# Patient Record
Sex: FEMALE | Race: WHITE | NOT HISPANIC OR LATINO | Employment: OTHER | URBAN - METROPOLITAN AREA
[De-identification: names, ages, dates, MRNs, and addresses within clinical notes are randomized per-mention and may not be internally consistent; named-entity substitution may affect disease eponyms.]

---

## 2023-04-17 DIAGNOSIS — E11.9 TYPE 2 DIABETES MELLITUS WITHOUT COMPLICATION, WITHOUT LONG-TERM CURRENT USE OF INSULIN (MULTI): ICD-10-CM

## 2023-04-17 RX ORDER — METFORMIN HYDROCHLORIDE 500 MG/1
1000 TABLET, EXTENDED RELEASE ORAL 2 TIMES DAILY
COMMUNITY
End: 2023-04-17 | Stop reason: SDUPTHER

## 2023-04-17 RX ORDER — METFORMIN HYDROCHLORIDE 500 MG/1
1000 TABLET, EXTENDED RELEASE ORAL 2 TIMES DAILY
Qty: 120 TABLET | Refills: 0 | Status: SHIPPED | OUTPATIENT
Start: 2023-04-17 | End: 2023-04-25 | Stop reason: SDUPTHER

## 2023-04-25 ENCOUNTER — LAB (OUTPATIENT)
Dept: LAB | Facility: LAB | Age: 38
End: 2023-04-25

## 2023-04-25 ENCOUNTER — OFFICE VISIT (OUTPATIENT)
Dept: PRIMARY CARE | Facility: CLINIC | Age: 38
End: 2023-04-25

## 2023-04-25 VITALS
SYSTOLIC BLOOD PRESSURE: 112 MMHG | HEIGHT: 63 IN | BODY MASS INDEX: 36.36 KG/M2 | WEIGHT: 205.2 LBS | DIASTOLIC BLOOD PRESSURE: 70 MMHG | HEART RATE: 95 BPM

## 2023-04-25 DIAGNOSIS — E78.2 MIXED HYPERLIPIDEMIA: ICD-10-CM

## 2023-04-25 DIAGNOSIS — E11.9 TYPE 2 DIABETES MELLITUS WITHOUT COMPLICATION, WITHOUT LONG-TERM CURRENT USE OF INSULIN (MULTI): ICD-10-CM

## 2023-04-25 DIAGNOSIS — E11.9 TYPE 2 DIABETES MELLITUS WITHOUT COMPLICATION, WITHOUT LONG-TERM CURRENT USE OF INSULIN (MULTI): Primary | ICD-10-CM

## 2023-04-25 DIAGNOSIS — E03.9 HYPOTHYROIDISM, UNSPECIFIED TYPE: ICD-10-CM

## 2023-04-25 PROBLEM — R53.83 FATIGUE: Status: ACTIVE | Noted: 2023-04-25

## 2023-04-25 PROBLEM — E78.5 HLD (HYPERLIPIDEMIA): Status: ACTIVE | Noted: 2023-04-25

## 2023-04-25 PROBLEM — R21 RASH: Status: RESOLVED | Noted: 2023-04-25 | Resolved: 2023-04-25

## 2023-04-25 PROBLEM — R73.9 HYPERGLYCEMIA: Status: RESOLVED | Noted: 2023-04-25 | Resolved: 2023-04-25

## 2023-04-25 LAB
ALANINE AMINOTRANSFERASE (SGPT) (U/L) IN SER/PLAS: 39 U/L (ref 7–45)
ALBUMIN (G/DL) IN SER/PLAS: 4.5 G/DL (ref 3.4–5)
ALKALINE PHOSPHATASE (U/L) IN SER/PLAS: 59 U/L (ref 33–110)
ANION GAP IN SER/PLAS: 11 MMOL/L (ref 10–20)
ASPARTATE AMINOTRANSFERASE (SGOT) (U/L) IN SER/PLAS: 21 U/L (ref 9–39)
BILIRUBIN TOTAL (MG/DL) IN SER/PLAS: 0.5 MG/DL (ref 0–1.2)
CALCIUM (MG/DL) IN SER/PLAS: 9.7 MG/DL (ref 8.6–10.3)
CARBON DIOXIDE, TOTAL (MMOL/L) IN SER/PLAS: 28 MMOL/L (ref 21–32)
CHLORIDE (MMOL/L) IN SER/PLAS: 102 MMOL/L (ref 98–107)
CHOLESTEROL (MG/DL) IN SER/PLAS: 258 MG/DL (ref 0–199)
CHOLESTEROL IN HDL (MG/DL) IN SER/PLAS: 72 MG/DL
CHOLESTEROL/HDL RATIO: 3.6
CREATININE (MG/DL) IN SER/PLAS: 0.58 MG/DL (ref 0.5–1.05)
ESTIMATED AVERAGE GLUCOSE FOR HBA1C: 143 MG/DL
GFR FEMALE: >90 ML/MIN/1.73M2
GLUCOSE (MG/DL) IN SER/PLAS: 142 MG/DL (ref 74–99)
HEMOGLOBIN A1C/HEMOGLOBIN TOTAL IN BLOOD: 6.6 %
LDL: 143 MG/DL (ref 0–99)
NON HDL CHOLESTEROL: 186 MG/DL
POTASSIUM (MMOL/L) IN SER/PLAS: 4 MMOL/L (ref 3.5–5.3)
PROTEIN TOTAL: 7.1 G/DL (ref 6.4–8.2)
SODIUM (MMOL/L) IN SER/PLAS: 137 MMOL/L (ref 136–145)
THYROTROPIN (MIU/L) IN SER/PLAS BY DETECTION LIMIT <= 0.05 MIU/L: 21.04 MIU/L (ref 0.44–3.98)
TRIGLYCERIDE (MG/DL) IN SER/PLAS: 213 MG/DL (ref 0–149)
UREA NITROGEN (MG/DL) IN SER/PLAS: 12 MG/DL (ref 6–23)
VLDL: 43 MG/DL (ref 0–40)

## 2023-04-25 PROCEDURE — 1036F TOBACCO NON-USER: CPT | Performed by: STUDENT IN AN ORGANIZED HEALTH CARE EDUCATION/TRAINING PROGRAM

## 2023-04-25 PROCEDURE — 3074F SYST BP LT 130 MM HG: CPT | Performed by: STUDENT IN AN ORGANIZED HEALTH CARE EDUCATION/TRAINING PROGRAM

## 2023-04-25 PROCEDURE — 3078F DIAST BP <80 MM HG: CPT | Performed by: STUDENT IN AN ORGANIZED HEALTH CARE EDUCATION/TRAINING PROGRAM

## 2023-04-25 PROCEDURE — 84443 ASSAY THYROID STIM HORMONE: CPT

## 2023-04-25 PROCEDURE — 99214 OFFICE O/P EST MOD 30 MIN: CPT | Performed by: STUDENT IN AN ORGANIZED HEALTH CARE EDUCATION/TRAINING PROGRAM

## 2023-04-25 PROCEDURE — 36415 COLL VENOUS BLD VENIPUNCTURE: CPT

## 2023-04-25 PROCEDURE — 83036 HEMOGLOBIN GLYCOSYLATED A1C: CPT

## 2023-04-25 PROCEDURE — 80053 COMPREHEN METABOLIC PANEL: CPT

## 2023-04-25 PROCEDURE — 80061 LIPID PANEL: CPT

## 2023-04-25 RX ORDER — LEVOTHYROXINE SODIUM 125 UG/1
TABLET ORAL
Qty: 60 TABLET | Refills: 1 | Status: SHIPPED | OUTPATIENT
Start: 2023-04-25 | End: 2023-04-27 | Stop reason: SDUPTHER

## 2023-04-25 RX ORDER — LEVOTHYROXINE SODIUM 112 UG/1
TABLET ORAL
Qty: 30 TABLET | Refills: 1 | Status: SHIPPED | OUTPATIENT
Start: 2023-04-25 | End: 2023-04-27 | Stop reason: ALTCHOICE

## 2023-04-25 RX ORDER — ROSUVASTATIN CALCIUM 10 MG/1
10 TABLET, COATED ORAL DAILY
COMMUNITY
End: 2023-04-25 | Stop reason: SDUPTHER

## 2023-04-25 RX ORDER — METFORMIN HYDROCHLORIDE 500 MG/1
1000 TABLET, EXTENDED RELEASE ORAL 2 TIMES DAILY
Qty: 360 TABLET | Refills: 1 | Status: SHIPPED | OUTPATIENT
Start: 2023-04-25 | End: 2023-12-05

## 2023-04-25 RX ORDER — LEVOTHYROXINE SODIUM 112 UG/1
TABLET ORAL
COMMUNITY
End: 2023-04-25 | Stop reason: SDUPTHER

## 2023-04-25 RX ORDER — ROSUVASTATIN CALCIUM 10 MG/1
10 TABLET, COATED ORAL DAILY
Qty: 90 TABLET | Refills: 1 | Status: SHIPPED | OUTPATIENT
Start: 2023-04-25 | End: 2023-12-14 | Stop reason: SDUPTHER

## 2023-04-25 RX ORDER — LEVOTHYROXINE SODIUM 125 UG/1
TABLET ORAL
COMMUNITY
End: 2023-04-25 | Stop reason: SDUPTHER

## 2023-04-25 ASSESSMENT — PATIENT HEALTH QUESTIONNAIRE - PHQ9
SUM OF ALL RESPONSES TO PHQ9 QUESTIONS 1 AND 2: 0
2. FEELING DOWN, DEPRESSED OR HOPELESS: NOT AT ALL
1. LITTLE INTEREST OR PLEASURE IN DOING THINGS: NOT AT ALL

## 2023-04-25 NOTE — RESULT ENCOUNTER NOTE
Hemoglobin A1c unfortunately increased to 6.6% but this continues to be within the goal target of below 6.9% so we will continue medications without changes    Cholesterol is very elevated surprisingly at 258, HDL 72, , triglycerides 213  Cholesterol was fantastic and perfect previously  Did the patient run out of her medication or what happened here?    Sugar elevated 142 but otherwise liver, kidneys, electrolytes are all within normal limits    Thyroid stimulating hormone at elevated at 21.04  Did the patient run out of her medications?    Please let me know and I will adjust her medications if she did not    Thank you

## 2023-04-25 NOTE — PROGRESS NOTES
Subjective   Patient ID: Caty Caputo is a 37 y.o. female who presents for Hypothyroidism, Hyperlipidemia, and Diabetes.  Today she is accompanied by alone.     HPI  1. type 2 Diabetes  Patient is currently taking Metformin; tolerating well.   She does state that the metformin sometimes she gets an upset stomach with diarrhea.  Currently she feels well without any issues as noted above  Her last hemoglobin A1c improved down to 6.3%    2. Hypothyroidism   Patient is currently taking levothyroxine and tolerating well.  Currently taking 125 mcg Monday through Friday and 112 mcg on Saturday and Sunday  Denies all acute symptoms and feels well  Her last thyroid was well within normal limits at 3.14  Willing to do blood work again    3. Hyperlipidemia   Currently on Rosuvastatin. Tolerating well.   Cholesterol noted stability with an LDL at 69 but triglycerides were elevated at 247 last time  She is fasting in preparation have lab work performed      Current Outpatient Medications on File Prior to Visit   Medication Sig Dispense Refill    [DISCONTINUED] levothyroxine (Synthroid, Levoxyl) 112 mcg tablet TAKE 1 TABLET BY MOUTH ONCE DAILY ON SAT AND SUN      [DISCONTINUED] levothyroxine (Synthroid, Levoxyl) 125 mcg tablet TAKE 1 TABLET BY MOUTH ONCE DAILY MONDAY- FRIDAY      [DISCONTINUED] metFORMIN XR (Glucophage-XR) 500 mg 24 hr tablet Take 2 tablets (1,000 mg) by mouth in the morning and 2 tablets (1,000 mg) before bedtime. 120 tablet 0    [DISCONTINUED] rosuvastatin (Crestor) 10 mg tablet Take 1 tablet (10 mg) by mouth once daily.       No current facility-administered medications on file prior to visit.        No Known Allergies    Immunization History   Administered Date(s) Administered    Moderna SARS-CoV-2 Vaccination 03/17/2021, 04/14/2021, 11/22/2021         Review of Systems  All pertinent positive symptoms are included in the history of present illness.  All other systems have been reviewed and are  "negative and noncontributory to this patient's current ailments.     Objective   /70 (BP Location: Left arm, Patient Position: Sitting, BP Cuff Size: Adult)   Pulse 95   Ht 1.594 m (5' 2.75\")   Wt 93.1 kg (205 lb 3.2 oz)   BMI 36.64 kg/m²   BSA: 2.03 meters squared  No visits with results within 1 Month(s) from this visit.   Latest known visit with results is:   [facility] Encounter on 08/31/2022   Component Date Value Ref Range Status    Cholesterol 08/31/2022 175  0 - 199 mg/dL Final    Comment: .      AGE      DESIRABLE   BORDERLINE HIGH   HIGH     0-19 Y     0 - 169       170 - 199     >/= 200    20-24 Y     0 - 189       190 - 224     >/= 225         >24 Y     0 - 199       200 - 239     >/= 240   **All ranges are based on fasting samples. Specific   therapeutic targets will vary based on patient-specific   cardiac risk.  .   Pediatric guidelines reference:Pediatrics 2011, 128(S5).   Adult guidelines reference: NCEP ATPIII Guidelines,     [place] 2001, 258:2486-97  .   Venipuncture immediately after or during the    administration of Metamizole may lead to falsely   low results. Testing should be performed immediately   prior to Metamizole dosing.      HDL 08/31/2022 56.8  mg/dL Final    Comment: .      AGE      VERY LOW   LOW     NORMAL    HIGH       0-19 Y       < 35   < 40     40-45     ----    20-24 Y       ----   < 40       >45     ----      >24 Y       ----   < 40     40-60      >60  .      Cholesterol/HDL Ratio 08/31/2022 3.1   Final    Comment: REF VALUES  DESIRABLE  < 3.4  HIGH RISK  > 5.0      LDL 08/31/2022 69  0 - 99 mg/dL Final    Comment: .                           NEAR      BORD      AGE      DESIRABLE  OPTIMAL    HIGH     HIGH     VERY HIGH     0-19 Y     0 - 109     ---    110-129   >/= 130     ----    20-24 Y     0 - 119     ---    120-159   >/= 160     ----      >24 Y     0 -  99   100-129  130-159   160-189     >/=190  .      VLDL 08/31/2022 49 (H)  0 - 40 mg/dL Final    Triglycerides " 08/31/2022 247 (H)  0 - 149 mg/dL Final    Comment: .      AGE      DESIRABLE   BORDERLINE HIGH   HIGH     VERY HIGH   0 D-90 D    19 - 174         ----         ----        ----  91 D- 9 Y     0 -  74        75 -  99     >/= 100      ----    10-19 Y     0 -  89        90 - 129     >/= 130      ----    20-24 Y     0 - 114       115 - 149     >/= 150      ----         >24 Y     0 - 149       150 - 199    200- 499    >/= 500  .   Venipuncture immediately after or during the    administration of Metamizole may lead to falsely   low results. Testing should be performed immediately   prior to Metamizole dosing.      Non HDL Cholesterol 08/31/2022 118  mg/dL Final    Comment:     AGE      DESIRABLE   BORDERLINE HIGH   HIGH     VERY HIGH     0-19 Y     0 - 119       120 - 144     >/= 145    >/= 160    20-24 Y     0 - 149       150 - 189     >/= 190      ----         >24 Y    30 MG/DL ABOVE LDL CHOLESTEROL GOAL  .      Glucose 08/31/2022 122 (H)  74 - 99 mg/dL Final    Sodium 08/31/2022 138  136 - 145 mmol/L Final    Potassium 08/31/2022 4.2  3.5 - 5.3 mmol/L Final    Chloride 08/31/2022 101  98 - 107 mmol/L Final    Bicarbonate 08/31/2022 29  21 - 32 mmol/L Final    Anion Gap 08/31/2022 12  10 - 20 mmol/L Final    Urea Nitrogen 08/31/2022 11  6 - 23 mg/dL Final    Creatinine 08/31/2022 0.68  0.50 - 1.05 mg/dL Final    GFR Female 08/31/2022 >90  >90 mL/min/1.73m2 Final    Comment:  CALCULATIONS OF ESTIMATED GFR ARE PERFORMED   USING THE 2021 CKD-EPI STUDY REFIT EQUATION   WITHOUT THE RACE VARIABLE FOR THE IDMS-TRACEABLE   CREATININE METHODS.    https://jasn.asnjournals.org/content/early/2021/09/22/ASN.4790209032      Calcium 08/31/2022 9.6  8.6 - 10.3 mg/dL Final    Albumin 08/31/2022 4.7  3.4 - 5.0 g/dL Final    Alkaline Phosphatase 08/31/2022 65  33 - 110 U/L Final    Total Protein 08/31/2022 7.5  6.4 - 8.2 g/dL Final    AST 08/31/2022 38  9 - 39 U/L Final    Total Bilirubin 08/31/2022 0.6  0.0 - 1.2 mg/dL Final    ALT  (SGPT) 08/31/2022 69 (H)  7 - 45 U/L Final    Comment:  Patients treated with Sulfasalazine may generate    falsely decreased results for ALT.      Hemoglobin A1C 08/31/2022 6.3 (A)  % Final    Comment:      Diagnosis of Diabetes-Adults   Non-Diabetic: < or = 5.6%   Increased risk for developing diabetes: 5.7-6.4%   Diagnostic of diabetes: > or = 6.5%  .       Monitoring of Diabetes                Age (y)     Therapeutic Goal (%)   Adults:          >18           <7.0   Pediatrics:    13-18           <7.5                   7-12           <8.0                   0- 6            7.5-8.5   American Diabetes Association. Diabetes Care 33(S1), Jan 2010.      Estimated Average Glucose 08/31/2022 134  MG/DL Final    TSH 08/31/2022 3.14  0.44 - 3.98 mIU/L Final    Comment:  TSH testing is performed using different testing    methodology at [facility] than at other    [facility] hospitals. Direct result comparisons should    only be made within the same method.         Physical Exam  CONSTITUTIONAL - well nourished, well developed, looks like stated age, in no acute distress, not ill-appearing, and not tired appearing  SKIN - normal skin color and pigmentation, normal skin turgor without rash, lesions, or nodules visualized  HEAD - no trauma, normocephalic  EYES - normal external exam  CHEST -no distressed breathing, good effort, LCTAB, no wheezes rales or crackles   Cardiac: RRR no MRG, no skipped beats, no murmurs  EXTREMITIES - no edema, no deformities  NEUROLOGICAL - normal balance, normal motor, no ataxia  PSYCHIATRIC - alert, pleasant and cordial, age-appropriate     Assessment/Plan   1. Type 2 Diabetes   We will electronically send a refill for your Metformin to the pharmacy you requested.   Lab work ordered today   We will notify you of results and make treatment recommendations accordingly.     2. Hypothyroidism   We will electronically send a refill for your levothyroxine to the pharmacy you requested.    Please continue to monitor signs and symptoms.   Lab work ordered today.  We will notify you of results and make treatment recommendations accordingly.     3. Hyperlipidemia   We will electronically send a refill for your Rosuvastatin to the pharmacy you requested.   Please continue to monitor signs and symptoms   Lab work ordered today   We will notify you of results and make treatment recommendations accordingly.    I also once again discussed the value looking into insurances such as care source/Medicaid so we can do other preventive medicine topics/needs  I understand you stated once again that you will look into this  Please notify the office if I can help out in any way

## 2023-04-26 NOTE — RESULT ENCOUNTER NOTE
I left patient a voicemail to call us back and let us know about her medications and if she ran out. Or if they needed to be increased.

## 2023-04-27 ENCOUNTER — TELEPHONE (OUTPATIENT)
Dept: PRIMARY CARE | Facility: CLINIC | Age: 38
End: 2023-04-27

## 2023-04-27 DIAGNOSIS — E03.9 HYPOTHYROIDISM, UNSPECIFIED TYPE: ICD-10-CM

## 2023-04-27 RX ORDER — LEVOTHYROXINE SODIUM 125 UG/1
TABLET ORAL
Qty: 90 TABLET | Refills: 0 | Status: SHIPPED | OUTPATIENT
Start: 2023-04-27 | End: 2023-12-14 | Stop reason: SDUPTHER

## 2023-04-27 NOTE — TELEPHONE ENCOUNTER
Spoke with the pt about her results, she states she was not taking her cholesterol medication consistently which explains her results there, she states she will start taking it daily going forward. However, she was consistently taking her thyroid so that dosage may need to be increased.

## 2023-04-27 NOTE — TELEPHONE ENCOUNTER
----- Message from Zayda Zuleta MA sent at 4/26/2023 11:11 AM EDT -----  I left patient a voicemail to call us back and let us know about her medications and if she ran out. Or if they needed to be increased.

## 2023-06-01 ENCOUNTER — LAB (OUTPATIENT)
Dept: LAB | Facility: LAB | Age: 38
End: 2023-06-01

## 2023-06-01 DIAGNOSIS — E03.9 HYPOTHYROIDISM, UNSPECIFIED TYPE: ICD-10-CM

## 2023-06-01 LAB — THYROTROPIN (MIU/L) IN SER/PLAS BY DETECTION LIMIT <= 0.05 MIU/L: 0.85 MIU/L (ref 0.44–3.98)

## 2023-06-01 PROCEDURE — 84443 ASSAY THYROID STIM HORMONE: CPT

## 2023-06-01 PROCEDURE — 36415 COLL VENOUS BLD VENIPUNCTURE: CPT

## 2023-06-02 ENCOUNTER — TELEPHONE (OUTPATIENT)
Dept: PRIMARY CARE | Facility: CLINIC | Age: 38
End: 2023-06-02

## 2023-06-02 NOTE — TELEPHONE ENCOUNTER
----- Message from Marissa Bueno, DO sent at 6/2/2023  6:53 AM EDT -----  Thyroid-stimulating hormone well within normal limits now at 0.85    I would recommend she continues medications as currently prescribed    Please let us know if she needs any refills

## 2023-06-02 NOTE — RESULT ENCOUNTER NOTE
Thyroid-stimulating hormone well within normal limits now at 0.85    I would recommend she continues medications as currently prescribed    Please let us know if she needs any refills

## 2023-12-05 DIAGNOSIS — E11.9 TYPE 2 DIABETES MELLITUS WITHOUT COMPLICATION, WITHOUT LONG-TERM CURRENT USE OF INSULIN (MULTI): ICD-10-CM

## 2023-12-05 RX ORDER — METFORMIN HYDROCHLORIDE 500 MG/1
1000 TABLET, EXTENDED RELEASE ORAL 2 TIMES DAILY
Qty: 120 TABLET | Refills: 0 | Status: SHIPPED | OUTPATIENT
Start: 2023-12-05 | End: 2023-12-14 | Stop reason: SDUPTHER

## 2023-12-14 ENCOUNTER — OFFICE VISIT (OUTPATIENT)
Dept: PRIMARY CARE | Facility: CLINIC | Age: 38
End: 2023-12-14

## 2023-12-14 ENCOUNTER — LAB (OUTPATIENT)
Dept: LAB | Facility: LAB | Age: 38
End: 2023-12-14

## 2023-12-14 VITALS
BODY MASS INDEX: 35.5 KG/M2 | HEART RATE: 106 BPM | SYSTOLIC BLOOD PRESSURE: 116 MMHG | DIASTOLIC BLOOD PRESSURE: 74 MMHG | WEIGHT: 198.8 LBS

## 2023-12-14 DIAGNOSIS — E78.2 MIXED HYPERLIPIDEMIA: ICD-10-CM

## 2023-12-14 DIAGNOSIS — E03.9 HYPOTHYROIDISM, UNSPECIFIED TYPE: ICD-10-CM

## 2023-12-14 DIAGNOSIS — E11.9 TYPE 2 DIABETES MELLITUS WITHOUT COMPLICATION, WITHOUT LONG-TERM CURRENT USE OF INSULIN (MULTI): ICD-10-CM

## 2023-12-14 LAB
ALBUMIN SERPL BCP-MCNC: 4.4 G/DL (ref 3.4–5)
ALP SERPL-CCNC: 67 U/L (ref 33–110)
ALT SERPL W P-5'-P-CCNC: 38 U/L (ref 7–45)
ANION GAP SERPL CALC-SCNC: 11 MMOL/L (ref 10–20)
AST SERPL W P-5'-P-CCNC: 20 U/L (ref 9–39)
BILIRUB SERPL-MCNC: 0.6 MG/DL (ref 0–1.2)
BUN SERPL-MCNC: 13 MG/DL (ref 6–23)
CALCIUM SERPL-MCNC: 9.2 MG/DL (ref 8.6–10.3)
CHLORIDE SERPL-SCNC: 101 MMOL/L (ref 98–107)
CHOLEST SERPL-MCNC: 129 MG/DL (ref 0–199)
CHOLESTEROL/HDL RATIO: 2.2
CO2 SERPL-SCNC: 29 MMOL/L (ref 21–32)
CREAT SERPL-MCNC: 0.58 MG/DL (ref 0.5–1.05)
EST. AVERAGE GLUCOSE BLD GHB EST-MCNC: 134 MG/DL
GFR SERPL CREATININE-BSD FRML MDRD: >90 ML/MIN/1.73M*2
GLUCOSE SERPL-MCNC: 133 MG/DL (ref 74–99)
HBA1C MFR BLD: 6.3 %
HDLC SERPL-MCNC: 59.2 MG/DL
LDLC SERPL CALC-MCNC: 34 MG/DL
NON HDL CHOLESTEROL: 70 MG/DL (ref 0–149)
POTASSIUM SERPL-SCNC: 4 MMOL/L (ref 3.5–5.3)
PROT SERPL-MCNC: 7 G/DL (ref 6.4–8.2)
SODIUM SERPL-SCNC: 137 MMOL/L (ref 136–145)
TRIGL SERPL-MCNC: 179 MG/DL (ref 0–149)
TSH SERPL-ACNC: 2.37 MIU/L (ref 0.44–3.98)
VLDL: 36 MG/DL (ref 0–40)

## 2023-12-14 PROCEDURE — 99214 OFFICE O/P EST MOD 30 MIN: CPT | Performed by: STUDENT IN AN ORGANIZED HEALTH CARE EDUCATION/TRAINING PROGRAM

## 2023-12-14 PROCEDURE — 3074F SYST BP LT 130 MM HG: CPT | Performed by: STUDENT IN AN ORGANIZED HEALTH CARE EDUCATION/TRAINING PROGRAM

## 2023-12-14 PROCEDURE — 80053 COMPREHEN METABOLIC PANEL: CPT

## 2023-12-14 PROCEDURE — 83036 HEMOGLOBIN GLYCOSYLATED A1C: CPT

## 2023-12-14 PROCEDURE — 3044F HG A1C LEVEL LT 7.0%: CPT | Performed by: STUDENT IN AN ORGANIZED HEALTH CARE EDUCATION/TRAINING PROGRAM

## 2023-12-14 PROCEDURE — 3078F DIAST BP <80 MM HG: CPT | Performed by: STUDENT IN AN ORGANIZED HEALTH CARE EDUCATION/TRAINING PROGRAM

## 2023-12-14 PROCEDURE — 84443 ASSAY THYROID STIM HORMONE: CPT

## 2023-12-14 PROCEDURE — 36415 COLL VENOUS BLD VENIPUNCTURE: CPT

## 2023-12-14 PROCEDURE — 1036F TOBACCO NON-USER: CPT | Performed by: STUDENT IN AN ORGANIZED HEALTH CARE EDUCATION/TRAINING PROGRAM

## 2023-12-14 PROCEDURE — 80061 LIPID PANEL: CPT

## 2023-12-14 RX ORDER — LEVOTHYROXINE SODIUM 125 UG/1
TABLET ORAL
Qty: 90 TABLET | Refills: 1 | Status: SHIPPED | OUTPATIENT
Start: 2023-12-14

## 2023-12-14 RX ORDER — METFORMIN HYDROCHLORIDE 500 MG/1
1000 TABLET, EXTENDED RELEASE ORAL 2 TIMES DAILY
Qty: 360 TABLET | Refills: 1 | Status: SHIPPED | OUTPATIENT
Start: 2023-12-14

## 2023-12-14 RX ORDER — ROSUVASTATIN CALCIUM 10 MG/1
10 TABLET, COATED ORAL DAILY
Qty: 90 TABLET | Refills: 1 | Status: SHIPPED | OUTPATIENT
Start: 2023-12-14

## 2023-12-14 ASSESSMENT — PATIENT HEALTH QUESTIONNAIRE - PHQ9
2. FEELING DOWN, DEPRESSED OR HOPELESS: NOT AT ALL
SUM OF ALL RESPONSES TO PHQ9 QUESTIONS 1 AND 2: 0
1. LITTLE INTEREST OR PLEASURE IN DOING THINGS: NOT AT ALL

## 2023-12-14 NOTE — PROGRESS NOTES
Subjective   Patient ID: Caty Caputo is a 38 y.o. female who presents for Diabetes, Hypothyroidism, and Hyperlipidemia.  Today she is accompanied by alone.     HPI  1.  Type 2 diabetes  Patient is currently taking Metformin; tolerating well.   Mentions that she feels well with the metformin but occasionally notes some diarrhea  Most recent hemoglobin A1c was increased at 6.6% just earlier this year  Otherwise denies any sugar high/lows    2. Hypothyroidism   Patient is currently taking levothyroxine and tolerating well.  Currently taking 125 mcg daily  Denies all acute symptoms and feels well  Last thyroid was well within normal limits at 0.85  Feels very well without any issues/complaints    3. Hyperlipidemia   Currently on Rosuvastatin. Tolerating well.   At her last lab work her LDL was very elevated at 143 but this was due to running out of medication  Currently taking all medication without any issues  Willing to do blood work      Current Outpatient Medications on File Prior to Visit   Medication Sig Dispense Refill    [DISCONTINUED] levothyroxine (Synthroid, Levoxyl) 125 mcg tablet TAKE 1 TABLET BY MOUTH ONCE DAILY MONDAY- FRIDAY 90 tablet 0    [DISCONTINUED] metFORMIN  mg 24 hr tablet TAKE 2 TABLETS BY MOUTH IN THE MORNING AND 2 AT BEDTIME 120 tablet 0    [DISCONTINUED] rosuvastatin (Crestor) 10 mg tablet Take 1 tablet (10 mg) by mouth once daily. 90 tablet 1     No current facility-administered medications on file prior to visit.        No Known Allergies    Immunization History   Administered Date(s) Administered    Moderna SARS-CoV-2 Vaccination 03/17/2021, 04/14/2021, 11/22/2021         Review of Systems  All pertinent positive symptoms are included in the history of present illness.  All other systems have been reviewed and are negative and noncontributory to this patient's current ailments.     Objective   /74 (BP Location: Left arm, Patient Position: Sitting, BP Cuff Size:  Adult)   Pulse 106   Wt 90.2 kg (198 lb 12.8 oz)   BMI 35.50 kg/m²   BSA: 2 meters squared  No visits with results within 1 Month(s) from this visit.   Latest known visit with results is:   Lab on 06/01/2023   Component Date Value Ref Range Status    TSH 06/01/2023 0.85  0.44 - 3.98 mIU/L Final     TSH testing is performed using different testing    methodology at [facility] than at other    [facility]. Direct result comparisons should    only be made within the same method.       Physical Exam  CONSTITUTIONAL - well nourished, well developed, looks like stated age, in no acute distress, not ill-appearing, and not tired appearing  SKIN - normal skin color and pigmentation, normal skin turgor without rash, lesions, or nodules visualized  HEAD - no trauma, normocephalic  EYES - normal external exam  CHEST -no distressed breathing, good effort, LCTAB, no wheezes rales or crackles   Cardiac: RRR no MRG, no skipped beats, no murmurs  EXTREMITIES - no edema, no deformities  NEUROLOGICAL - normal balance, normal motor, no ataxia  PSYCHIATRIC - alert, pleasant and cordial, age-appropriate     Assessment/Plan   1. Type 2 Diabetes   We will electronically send a refill for your Metformin to the pharmacy you requested.   Lab work ordered today   We will notify you of results and make treatment recommendations accordingly.     2. Hypothyroidism   We will electronically send a refill for your levothyroxine to the pharmacy you requested.   Please continue to monitor signs and symptoms.   Lab work ordered today.  We will notify you of results and make treatment recommendations accordingly.     3. Hyperlipidemia   We will electronically send a refill for your Rosuvastatin to the pharmacy you requested.   Please continue to monitor signs and symptoms   Lab work ordered today   We will notify you of results and make treatment recommendations accordingly.    I also once again discussed the value looking into  insurances such as care source/Medicaid so we can do other preventive medicine topics/needs  I understand you stated once again that you will look into this  Please notify the office if I can help out in any way

## 2023-12-15 NOTE — RESULT ENCOUNTER NOTE
Cholesterol dropped down dramatically down to 129, HDL 59, LDL 34, triglycerides 179    Sugar elevated 133 but otherwise liver, kidneys, electrolytes within normal limits    Hemoglobin A1c the best on file at 6.3%    Thyroid also well within normal limits    Keep up the great work

## 2024-06-25 DIAGNOSIS — E03.9 HYPOTHYROIDISM, UNSPECIFIED TYPE: ICD-10-CM

## 2024-06-25 RX ORDER — LEVOTHYROXINE SODIUM 125 UG/1
TABLET ORAL
Qty: 30 TABLET | Refills: 0 | Status: SHIPPED | OUTPATIENT
Start: 2024-06-25

## 2024-07-11 ENCOUNTER — APPOINTMENT (OUTPATIENT)
Dept: PRIMARY CARE | Facility: CLINIC | Age: 39
End: 2024-07-11

## 2024-07-11 ENCOUNTER — LAB (OUTPATIENT)
Dept: LAB | Facility: LAB | Age: 39
End: 2024-07-11

## 2024-07-11 VITALS
SYSTOLIC BLOOD PRESSURE: 130 MMHG | BODY MASS INDEX: 35.35 KG/M2 | OXYGEN SATURATION: 97 % | DIASTOLIC BLOOD PRESSURE: 80 MMHG | WEIGHT: 198 LBS | HEART RATE: 92 BPM

## 2024-07-11 DIAGNOSIS — E11.9 TYPE 2 DIABETES MELLITUS WITHOUT COMPLICATION, WITHOUT LONG-TERM CURRENT USE OF INSULIN (MULTI): ICD-10-CM

## 2024-07-11 DIAGNOSIS — E78.2 MIXED HYPERLIPIDEMIA: ICD-10-CM

## 2024-07-11 DIAGNOSIS — E03.9 HYPOTHYROIDISM, UNSPECIFIED TYPE: ICD-10-CM

## 2024-07-11 LAB
ALBUMIN SERPL BCP-MCNC: 4.8 G/DL (ref 3.4–5)
ALP SERPL-CCNC: 65 U/L (ref 33–110)
ALT SERPL W P-5'-P-CCNC: 33 U/L (ref 7–45)
ANION GAP SERPL CALC-SCNC: 12 MMOL/L (ref 10–20)
AST SERPL W P-5'-P-CCNC: 22 U/L (ref 9–39)
BILIRUB SERPL-MCNC: 0.6 MG/DL (ref 0–1.2)
BUN SERPL-MCNC: 12 MG/DL (ref 6–23)
CALCIUM SERPL-MCNC: 9.6 MG/DL (ref 8.6–10.3)
CHLORIDE SERPL-SCNC: 103 MMOL/L (ref 98–107)
CHOLEST SERPL-MCNC: 135 MG/DL (ref 0–199)
CHOLESTEROL/HDL RATIO: 2.4
CO2 SERPL-SCNC: 27 MMOL/L (ref 21–32)
CREAT SERPL-MCNC: 0.69 MG/DL (ref 0.5–1.05)
EGFRCR SERPLBLD CKD-EPI 2021: >90 ML/MIN/1.73M*2
GLUCOSE SERPL-MCNC: 124 MG/DL (ref 74–99)
HDLC SERPL-MCNC: 55.3 MG/DL
LDLC SERPL CALC-MCNC: 52 MG/DL
NON HDL CHOLESTEROL: 80 MG/DL (ref 0–149)
POTASSIUM SERPL-SCNC: 4 MMOL/L (ref 3.5–5.3)
PROT SERPL-MCNC: 7.6 G/DL (ref 6.4–8.2)
SODIUM SERPL-SCNC: 138 MMOL/L (ref 136–145)
TRIGL SERPL-MCNC: 139 MG/DL (ref 0–149)
TSH SERPL-ACNC: 0.92 MIU/L (ref 0.44–3.98)
VLDL: 28 MG/DL (ref 0–40)

## 2024-07-11 PROCEDURE — 3079F DIAST BP 80-89 MM HG: CPT | Performed by: STUDENT IN AN ORGANIZED HEALTH CARE EDUCATION/TRAINING PROGRAM

## 2024-07-11 PROCEDURE — 3075F SYST BP GE 130 - 139MM HG: CPT | Performed by: STUDENT IN AN ORGANIZED HEALTH CARE EDUCATION/TRAINING PROGRAM

## 2024-07-11 PROCEDURE — 80061 LIPID PANEL: CPT

## 2024-07-11 PROCEDURE — 1036F TOBACCO NON-USER: CPT | Performed by: STUDENT IN AN ORGANIZED HEALTH CARE EDUCATION/TRAINING PROGRAM

## 2024-07-11 PROCEDURE — 36415 COLL VENOUS BLD VENIPUNCTURE: CPT

## 2024-07-11 PROCEDURE — 84443 ASSAY THYROID STIM HORMONE: CPT

## 2024-07-11 PROCEDURE — 80053 COMPREHEN METABOLIC PANEL: CPT

## 2024-07-11 PROCEDURE — 83036 HEMOGLOBIN GLYCOSYLATED A1C: CPT

## 2024-07-11 PROCEDURE — 99214 OFFICE O/P EST MOD 30 MIN: CPT | Performed by: STUDENT IN AN ORGANIZED HEALTH CARE EDUCATION/TRAINING PROGRAM

## 2024-07-11 RX ORDER — ROSUVASTATIN CALCIUM 10 MG/1
10 TABLET, COATED ORAL DAILY
Qty: 90 TABLET | Refills: 1 | Status: SHIPPED | OUTPATIENT
Start: 2024-07-11

## 2024-07-11 RX ORDER — LEVOTHYROXINE SODIUM 125 UG/1
TABLET ORAL
Qty: 90 TABLET | Refills: 1 | Status: SHIPPED | OUTPATIENT
Start: 2024-07-11

## 2024-07-11 RX ORDER — METFORMIN HYDROCHLORIDE 500 MG/1
1000 TABLET, EXTENDED RELEASE ORAL 2 TIMES DAILY
Qty: 360 TABLET | Refills: 1 | Status: SHIPPED | OUTPATIENT
Start: 2024-07-11

## 2024-07-11 NOTE — PROGRESS NOTES
Subjective   Patient ID: Caty Caputo is a 38 y.o. female who presents for Diabetes, Hypothyroidism, and Hyperlipidemia.  Today she is accompanied by alone.     HPI  1.  Type 2 diabetes  Patient is currently taking Metformin; tolerating well.   Mentions that she feels well with the metformin  Most recent hemoglobin A1c was stable at 6.3%  Otherwise denies any sugar high/lows  Requesting refills    2. Hypothyroidism   Patient is currently taking levothyroxine and tolerating well.  Currently taking 125 mcg daily  Denies any signs/symptoms of weight gain, hot/cold intolerances, hair loss, etc.  Most recent TSH was at 2.37 and feels well    3. Hyperlipidemia   Currently on Rosuvastatin. Tolerating well.   Previous labs showed a total cholesterol 129, HDL 59, LDL 34, triglycerides 179  Currently taking all medication without any issues  Willing to do blood work  Requesting refills      Current Outpatient Medications on File Prior to Visit   Medication Sig Dispense Refill    [DISCONTINUED] levothyroxine (Synthroid, Levoxyl) 125 mcg tablet TAKE 1 TABLET BY MOUTH ONCE DAILY 30 tablet 0    [DISCONTINUED] metFORMIN  mg 24 hr tablet Take 2 tablets (1,000 mg) by mouth 2 times a day. Take in the morning and at bedtime 360 tablet 1    [DISCONTINUED] rosuvastatin (Crestor) 10 mg tablet Take 1 tablet (10 mg) by mouth once daily. 90 tablet 1     No current facility-administered medications on file prior to visit.        No Known Allergies    There is no immunization history for the selected administration types on file for this patient.        Review of Systems  All pertinent positive symptoms are included in the history of present illness.  All other systems have been reviewed and are negative and noncontributory to this patient's current ailments.     Objective   /80 (BP Location: Left arm, Patient Position: Sitting, BP Cuff Size: Large adult)   Pulse 92   Wt 89.8 kg (198 lb)   SpO2 97%   BMI 35.35 kg/m²    BSA: 1.99 meters squared  No visits with results within 1 Month(s) from this visit.   Latest known visit with results is:   Lab on 12/14/2023   Component Date Value Ref Range Status    Thyroid Stimulating Hormone 12/14/2023 2.37  0.44 - 3.98 mIU/L Final    Cholesterol 12/14/2023 129  0 - 199 mg/dL Final          Age      Desirable   Borderline High   High     0-19 Y     0 - 169       170 - 199     >/= 200    20-24 Y     0 - 189       190 - 224     >/= 225         >24 Y     0 - 199       200 - 239     >/= 240   **All ranges are based on fasting samples. Specific   therapeutic targets will vary based on patient-specific   cardiac risk.    Pediatric guidelines reference:Pediatrics 2011, 128(S5).Adult guidelines reference: NCEP ATPIII Guidelines,[place] 2001, 258:2486-97    Venipuncture immediately after or during the administration of Metamizole may lead to falsely low results. Testing should be performed immediately prior to Metamizole dosing.    HDL-Cholesterol 12/14/2023 59.2  mg/dL Final      Age       Very Low   Low     Normal    High    0-19 Y    < 35      < 40     40-45     ----  20-24 Y    ----     < 40      >45      ----        >24 Y      ----     < 40     40-60      >60      Cholesterol/HDL Ratio 12/14/2023 2.2   Final      Ref Values  Desirable  < 3.4  High Risk  > 5.0    LDL Calculated 12/14/2023 34  <=99 mg/dL Final                                Near   Borderline      AGE      Desirable  Optimal    High     High     Very High     0-19 Y     0 - 109     ---    110-129   >/= 130     ----    20-24 Y     0 - 119     ---    120-159   >/= 160     ----      >24 Y     0 -  99   100-129  130-159   160-189     >/=190      VLDL 12/14/2023 36  0 - 40 mg/dL Final    Triglycerides 12/14/2023 179 (H)  0 - 149 mg/dL Final       Age         Desirable   Borderline High   High     Very High   0 D-90 D    19 - 174         ----         ----        ----  91 D- 9 Y     0 -  74        75 -  99     >/= 100      ----    10-19 Y      0 -  89        90 - 129     >/= 130      ----    20-24 Y     0 - 114       115 - 149     >/= 150      ----         >24 Y     0 - 149       150 - 199    200- 499    >/= 500    Venipuncture immediately after or during the administration of Metamizole may lead to falsely low results. Testing should be performed immediately prior to Metamizole dosing.    Non HDL Cholesterol 12/14/2023 70  0 - 149 mg/dL Final          Age       Desirable   Borderline High   High     Very High     0-19 Y     0 - 119       120 - 144     >/= 145    >/= 160    20-24 Y     0 - 149       150 - 189     >/= 190      ----         >24 Y    30 mg/dL above LDL Cholesterol goal      Glucose 12/14/2023 133 (H)  74 - 99 mg/dL Final    Sodium 12/14/2023 137  136 - 145 mmol/L Final    Potassium 12/14/2023 4.0  3.5 - 5.3 mmol/L Final    Chloride 12/14/2023 101  98 - 107 mmol/L Final    Bicarbonate 12/14/2023 29  21 - 32 mmol/L Final    Anion Gap 12/14/2023 11  10 - 20 mmol/L Final    Urea Nitrogen 12/14/2023 13  6 - 23 mg/dL Final    Creatinine 12/14/2023 0.58  0.50 - 1.05 mg/dL Final    eGFR 12/14/2023 >90  >60 mL/min/1.73m*2 Final    Calculations of estimated GFR are performed using the 2021 CKD-EPI Study Refit equation without the race variable for the IDMS-Traceable creatinine methods.  https://jasn.asnjournals.org/content/early/2021/09/22/ASN.3601809903    Calcium 12/14/2023 9.2  8.6 - 10.3 mg/dL Final    Albumin 12/14/2023 4.4  3.4 - 5.0 g/dL Final    Alkaline Phosphatase 12/14/2023 67  33 - 110 U/L Final    Total Protein 12/14/2023 7.0  6.4 - 8.2 g/dL Final    AST 12/14/2023 20  9 - 39 U/L Final    Bilirubin, Total 12/14/2023 0.6  0.0 - 1.2 mg/dL Final    ALT 12/14/2023 38  7 - 45 U/L Final    Patients treated with Sulfasalazine may generate falsely decreased results for ALT.    Hemoglobin A1C 12/14/2023 6.3 (H)  see below % Final    Estimated Average Glucose 12/14/2023 134  Not Established mg/dL Final       Physical Exam  CONSTITUTIONAL - well  nourished, well developed, looks like stated age, in no acute distress, not ill-appearing, and not tired appearing  SKIN - normal skin color and pigmentation, normal skin turgor without rash, lesions, or nodules visualized  HEAD - no trauma, normocephalic  EYES - normal external exam  CHEST -no distressed breathing, good effort, LCTAB, no wheezes rales or crackles   Cardiac: RRR no MRG, no skipped beats, no murmurs  EXTREMITIES - no edema, no deformities  NEUROLOGICAL - normal balance, normal motor, no ataxia  PSYCHIATRIC - alert, pleasant and cordial, age-appropriate     Assessment/Plan   1. Type 2 Diabetes   We will electronically send a refill for your Metformin to the pharmacy you requested.   Lab work ordered today   We will notify you of results and make treatment recommendations accordingly.     2. Hypothyroidism   We will electronically send a refill for your levothyroxine to the pharmacy you requested.   Please continue to monitor signs and symptoms.   Lab work ordered today.  We will notify you of results and make treatment recommendations accordingly.     3. Hyperlipidemia   We will electronically send a refill for your Rosuvastatin to the pharmacy you requested.   Please continue to monitor signs and symptoms   Lab work ordered today   We will notify you of results and make treatment recommendations accordingly.    We briefly discussed once again looking into insurances such as care source/Medicaid or any other medical coverage  Please do this at your earliest convenience  Contact the office if this does occur and we can discuss preventative medicine topics/needs further

## 2024-07-12 LAB
EST. AVERAGE GLUCOSE BLD GHB EST-MCNC: 148 MG/DL
HBA1C MFR BLD: 6.8 %

## 2024-07-12 NOTE — RESULT ENCOUNTER NOTE
Hemoglobin A1c unfortunately increase further up to 6.8%    I would recommend diet and exercise at this time and in the future we can easily discuss increasing her medication if this stays high

## 2024-07-12 NOTE — RESULT ENCOUNTER NOTE
Sugar is slightly elevated 124 but otherwise liver, kidneys, electrolytes within normal limits    Thyroid well within normal limits    Cholesterol looks fantastic at 135, HDL 55, LDL 52, triglycerides 139    This lab work so far is fantastic    Lastly, we are just waiting for the hemoglobin A1c at this time

## 2025-01-16 DIAGNOSIS — E11.9 TYPE 2 DIABETES MELLITUS WITHOUT COMPLICATION, WITHOUT LONG-TERM CURRENT USE OF INSULIN (MULTI): ICD-10-CM

## 2025-01-16 DIAGNOSIS — E03.9 HYPOTHYROIDISM, UNSPECIFIED TYPE: ICD-10-CM

## 2025-01-16 RX ORDER — LEVOTHYROXINE SODIUM 125 UG/1
TABLET ORAL
Qty: 30 TABLET | Refills: 0 | Status: SHIPPED | OUTPATIENT
Start: 2025-01-16

## 2025-01-16 RX ORDER — METFORMIN HYDROCHLORIDE 500 MG/1
1000 TABLET, EXTENDED RELEASE ORAL 2 TIMES DAILY
Qty: 120 TABLET | Refills: 0 | Status: SHIPPED | OUTPATIENT
Start: 2025-01-16

## 2025-01-27 ENCOUNTER — APPOINTMENT (OUTPATIENT)
Dept: PRIMARY CARE | Facility: CLINIC | Age: 40
End: 2025-01-27

## 2025-01-27 VITALS
HEART RATE: 120 BPM | DIASTOLIC BLOOD PRESSURE: 80 MMHG | OXYGEN SATURATION: 97 % | SYSTOLIC BLOOD PRESSURE: 122 MMHG | WEIGHT: 202 LBS | BODY MASS INDEX: 36.07 KG/M2

## 2025-01-27 DIAGNOSIS — E78.2 MIXED HYPERLIPIDEMIA: ICD-10-CM

## 2025-01-27 DIAGNOSIS — E11.9 TYPE 2 DIABETES MELLITUS WITHOUT COMPLICATION, WITHOUT LONG-TERM CURRENT USE OF INSULIN (MULTI): ICD-10-CM

## 2025-01-27 DIAGNOSIS — E03.9 HYPOTHYROIDISM, UNSPECIFIED TYPE: ICD-10-CM

## 2025-01-27 PROCEDURE — 3079F DIAST BP 80-89 MM HG: CPT | Performed by: STUDENT IN AN ORGANIZED HEALTH CARE EDUCATION/TRAINING PROGRAM

## 2025-01-27 PROCEDURE — 3074F SYST BP LT 130 MM HG: CPT | Performed by: STUDENT IN AN ORGANIZED HEALTH CARE EDUCATION/TRAINING PROGRAM

## 2025-01-27 PROCEDURE — 1036F TOBACCO NON-USER: CPT | Performed by: STUDENT IN AN ORGANIZED HEALTH CARE EDUCATION/TRAINING PROGRAM

## 2025-01-27 PROCEDURE — 99214 OFFICE O/P EST MOD 30 MIN: CPT | Performed by: STUDENT IN AN ORGANIZED HEALTH CARE EDUCATION/TRAINING PROGRAM

## 2025-01-27 RX ORDER — ROSUVASTATIN CALCIUM 10 MG/1
10 TABLET, COATED ORAL DAILY
Qty: 90 TABLET | Refills: 1 | Status: SHIPPED | OUTPATIENT
Start: 2025-01-27

## 2025-01-27 RX ORDER — METFORMIN HYDROCHLORIDE 500 MG/1
1000 TABLET, EXTENDED RELEASE ORAL 2 TIMES DAILY
Qty: 360 TABLET | Refills: 1 | Status: SHIPPED | OUTPATIENT
Start: 2025-01-27

## 2025-01-27 RX ORDER — LEVOTHYROXINE SODIUM 125 UG/1
TABLET ORAL
Qty: 90 TABLET | Refills: 1 | Status: SHIPPED | OUTPATIENT
Start: 2025-01-27

## 2025-01-27 ASSESSMENT — PATIENT HEALTH QUESTIONNAIRE - PHQ9
1. LITTLE INTEREST OR PLEASURE IN DOING THINGS: NOT AT ALL
2. FEELING DOWN, DEPRESSED OR HOPELESS: NOT AT ALL
SUM OF ALL RESPONSES TO PHQ9 QUESTIONS 1 AND 2: 0

## 2025-01-27 NOTE — PROGRESS NOTES
Subjective   Patient ID: Caty Caputo is a 39 y.o. female who presents for Diabetes, Hyperlipidemia, and Hypothyroidism.  Today she is accompanied by alone.     HPI  1.  Type 2 diabetes  Patient is currently taking Metformin; tolerating well.   Mentions that she feels well with the metformin  Most recent hemoglobin A1c was stable at 6.8%  Otherwise denies any sugar high/lows  Requesting refills     2. Hypothyroidism   Patient is currently taking levothyroxine and tolerating well.  Currently taking 125 mcg daily  Denies any signs/symptoms of weight gain, hot/cold intolerances, hair loss, etc.  Most recent TSH was at 0.92 and feels well     3. Hyperlipidemia   Currently on Rosuvastatin. Tolerating well.   Previous labs showed a total cholesterol 135, HDL 55.3, LDL 52, triglycerides 139  Currently taking all medication without any issues  Willing to do blood work  Requesting refills       Current Outpatient Medications on File Prior to Visit   Medication Sig Dispense Refill    [DISCONTINUED] levothyroxine (Synthroid, Levoxyl) 125 mcg tablet TAKE 1 TABLET BY MOUTH ONCE DAILY 30 tablet 0    [DISCONTINUED] metFORMIN  mg 24 hr tablet Take 2 tablets (1,000 mg) by mouth 2 times a day. Take in the morning and at bedtime 120 tablet 0    [DISCONTINUED] rosuvastatin (Crestor) 10 mg tablet Take 1 tablet (10 mg) by mouth once daily. 90 tablet 1     No current facility-administered medications on file prior to visit.        Allergies   Allergen Reactions    Amoxicillin Hives       There is no immunization history for the selected administration types on file for this patient.      Review of Systems  All pertinent positive symptoms are included in the history of present illness.  All other systems have been reviewed and are negative and noncontributory to this patient's current ailments.     Objective   /80 (BP Location: Left arm, Patient Position: Sitting, BP Cuff Size: Adult)   Pulse (!) 120   Wt 91.6 kg  (202 lb)   SpO2 97%   BMI 36.07 kg/m²   BSA: 2.01 meters squared  No visits with results within 1 Month(s) from this visit.   Latest known visit with results is:   Lab on 07/11/2024   Component Date Value Ref Range Status    Thyroid Stimulating Hormone 07/11/2024 0.92  0.44 - 3.98 mIU/L Final    Cholesterol 07/11/2024 135  0 - 199 mg/dL Final          Age      Desirable   Borderline High   High     0-19 Y     0 - 169       170 - 199     >/= 200    20-24 Y     0 - 189       190 - 224     >/= 225         >24 Y     0 - 199       200 - 239     >/= 240   **All ranges are based on fasting samples. Specific   therapeutic targets will vary based on patient-specific   cardiac risk.    Pediatric guidelines reference:Pediatrics 2011, 128(S5).Adult guidelines reference: NCEP ATPIII Guidelines,EASTON 2001, 258:2486-97    Venipuncture immediately after or during the administration of Metamizole may lead to falsely low results. Testing should be performed immediately prior to Metamizole dosing.    HDL-Cholesterol 07/11/2024 55.3  mg/dL Final      Age       Very Low   Low     Normal    High    0-19 Y    < 35      < 40     40-45     ----  20-24 Y    ----     < 40      >45      ----        >24 Y      ----     < 40     40-60      >60      Cholesterol/HDL Ratio 07/11/2024 2.4   Final      Ref Values  Desirable  < 3.4  High Risk  > 5.0    LDL Calculated 07/11/2024 52  <=99 mg/dL Final                                Near   Borderline      AGE      Desirable  Optimal    High     High     Very High     0-19 Y     0 - 109     ---    110-129   >/= 130     ----    20-24 Y     0 - 119     ---    120-159   >/= 160     ----      >24 Y     0 -  99   100-129  130-159   160-189     >/=190      VLDL 07/11/2024 28  0 - 40 mg/dL Final    Triglycerides 07/11/2024 139  0 - 149 mg/dL Final       Age         Desirable   Borderline High   High     Very High   0 D-90 D    19 - 174         ----         ----        ----  91 D- 9 Y     0 -  74        75 -  99      >/= 100      ----    10-19 Y     0 -  89        90 - 129     >/= 130      ----    20-24 Y     0 - 114       115 - 149     >/= 150      ----         >24 Y     0 - 149       150 - 199    200- 499    >/= 500    Venipuncture immediately after or during the administration of Metamizole may lead to falsely low results. Testing should be performed immediately prior to Metamizole dosing.    Non HDL Cholesterol 07/11/2024 80  0 - 149 mg/dL Final          Age       Desirable   Borderline High   High     Very High     0-19 Y     0 - 119       120 - 144     >/= 145    >/= 160    20-24 Y     0 - 149       150 - 189     >/= 190      ----         >24 Y    30 mg/dL above LDL Cholesterol goal      Glucose 07/11/2024 124 (H)  74 - 99 mg/dL Final    Sodium 07/11/2024 138  136 - 145 mmol/L Final    Potassium 07/11/2024 4.0  3.5 - 5.3 mmol/L Final    Chloride 07/11/2024 103  98 - 107 mmol/L Final    Bicarbonate 07/11/2024 27  21 - 32 mmol/L Final    Anion Gap 07/11/2024 12  10 - 20 mmol/L Final    Urea Nitrogen 07/11/2024 12  6 - 23 mg/dL Final    Creatinine 07/11/2024 0.69  0.50 - 1.05 mg/dL Final    eGFR 07/11/2024 >90  >60 mL/min/1.73m*2 Final    Calculations of estimated GFR are performed using the 2021 CKD-EPI Study Refit equation without the race variable for the IDMS-Traceable creatinine methods.  https://jasn.asnjournals.org/content/early/2021/09/22/ASN.3385666655    Calcium 07/11/2024 9.6  8.6 - 10.3 mg/dL Final    Albumin 07/11/2024 4.8  3.4 - 5.0 g/dL Final    Alkaline Phosphatase 07/11/2024 65  33 - 110 U/L Final    Total Protein 07/11/2024 7.6  6.4 - 8.2 g/dL Final    AST 07/11/2024 22  9 - 39 U/L Final    Bilirubin, Total 07/11/2024 0.6  0.0 - 1.2 mg/dL Final    ALT 07/11/2024 33  7 - 45 U/L Final    Patients treated with Sulfasalazine may generate falsely decreased results for ALT.    Hemoglobin A1C 07/11/2024 6.8 (H)  see below % Final    Estimated Average Glucose 07/11/2024 148  Not Established mg/dL Final        Physical Exam  CONSTITUTIONAL - well nourished, well developed, looks like stated age, in no acute distress, not ill-appearing, and not tired appearing  SKIN - normal skin color and pigmentation, normal skin turgor without rash, lesions, or nodules visualized  HEAD - no trauma, normocephalic  EYES - normal external exam  ENT - TM's intact, no injection, no signs of infection, uvula midline, normal tongue movement and throat normal, no exudate, nasal passage without discharge and patent  NECK - supple without rigidity, no neck mass was observed, no thyromegaly or thyroid nodules  CHEST - clear to auscultation, no wheezing, no crackles and no rales, good effort  CARDIAC - regular rate and regular rhythm, no skipped beats, no murmur  ABDOMEN - no organomegaly, soft, nontender, nondistended, normal bowel sounds, no guarding/rebound/rigidity, negative McBurney sign and negative Danielle sign  EXTREMITIES - no edema, no deformities  NEUROLOGICAL - normal gait, normal balance, normal motor, no ataxia  PSYCHIATRIC - alert, pleasant and cordial, age-appropriate  IMMUNOLOGIC - no cervical lymphadenopathy     Assessment/Plan   1.  Type 2 diabetes  We will electronically send a refill for your Metformin to the pharmacy you requested.   No changes recommended at this time  Lab work ordered and we will notify with the results and make recommendations accordingly    2. Hypothyroidism   We will electronically send a refill for your levothyroxine to the pharmacy you requested.   Please continue to monitor signs and symptoms.  Update lab work and we will notify with results and make recommendations accordingly    3. Hyperlipidemia  We will electronically send a refill for your Rosuvastatin to the pharmacy you requested.   Please continue to monitor signs and symptoms   Please perform lab work when you are fasting, we will notify of the results and make recommendations accordingly      We briefly discussed once again looking into  insurances such as care source/Medicaid or any other medical coverage  Please do this at your earliest convenience  Contact the office if this does occur and we can discuss preventative medicine topics/needs further    Lastly, please follow-up in 6 months for continued care

## 2025-01-29 LAB
ALBUMIN SERPL-MCNC: 4.6 G/DL (ref 3.6–5.1)
ALP SERPL-CCNC: 64 U/L (ref 31–125)
ALT SERPL-CCNC: 27 U/L (ref 6–29)
ANION GAP SERPL CALCULATED.4IONS-SCNC: 13 MMOL/L (CALC) (ref 7–17)
AST SERPL-CCNC: 17 U/L (ref 10–30)
BILIRUB SERPL-MCNC: 0.6 MG/DL (ref 0.2–1.2)
BUN SERPL-MCNC: 11 MG/DL (ref 7–25)
CALCIUM SERPL-MCNC: 9.2 MG/DL (ref 8.6–10.2)
CHLORIDE SERPL-SCNC: 100 MMOL/L (ref 98–110)
CHOLEST SERPL-MCNC: 138 MG/DL
CHOLEST/HDLC SERPL: 2.2 (CALC)
CO2 SERPL-SCNC: 26 MMOL/L (ref 20–32)
CREAT SERPL-MCNC: 0.59 MG/DL (ref 0.5–0.97)
EGFRCR SERPLBLD CKD-EPI 2021: 117 ML/MIN/1.73M2
EST. AVERAGE GLUCOSE BLD GHB EST-MCNC: 166 MG/DL
EST. AVERAGE GLUCOSE BLD GHB EST-SCNC: 9.2 MMOL/L
GLUCOSE SERPL-MCNC: 155 MG/DL (ref 65–99)
HBA1C MFR BLD: 7.4 % OF TOTAL HGB
HDLC SERPL-MCNC: 63 MG/DL
LDLC SERPL CALC-MCNC: 55 MG/DL (CALC)
NONHDLC SERPL-MCNC: 75 MG/DL (CALC)
POTASSIUM SERPL-SCNC: 4.2 MMOL/L (ref 3.5–5.3)
PROT SERPL-MCNC: 7.1 G/DL (ref 6.1–8.1)
SODIUM SERPL-SCNC: 139 MMOL/L (ref 135–146)
TRIGL SERPL-MCNC: 116 MG/DL
TSH SERPL-ACNC: 2.88 MIU/L

## 2025-01-29 NOTE — RESULT ENCOUNTER NOTE
Hemoglobin A1c unfortunately elevated at 7.4%  The patient has a few choices, she can try diet and exercise alongside the metformin or I can add on another medication to aid in this blood sugar    Please advise      Sugar is elevated 155 but otherwise liver, kidneys, electrolyte within normal limits    Thyroid within normal limits    Cholesterol looks good at 138, HDL 63, triglycerides 116, LDL 55

## 2025-06-03 DIAGNOSIS — E03.9 HYPOTHYROIDISM, UNSPECIFIED TYPE: ICD-10-CM

## 2025-06-03 RX ORDER — LEVOTHYROXINE SODIUM 125 UG/1
TABLET ORAL
Qty: 90 TABLET | Refills: 0 | Status: SHIPPED | OUTPATIENT
Start: 2025-06-03

## 2025-08-07 ENCOUNTER — APPOINTMENT (OUTPATIENT)
Dept: PRIMARY CARE | Facility: CLINIC | Age: 40
End: 2025-08-07

## 2025-08-07 VITALS
SYSTOLIC BLOOD PRESSURE: 124 MMHG | BODY MASS INDEX: 35.79 KG/M2 | HEART RATE: 68 BPM | HEIGHT: 63 IN | WEIGHT: 202 LBS | DIASTOLIC BLOOD PRESSURE: 80 MMHG

## 2025-08-07 DIAGNOSIS — E03.9 HYPOTHYROIDISM, UNSPECIFIED TYPE: ICD-10-CM

## 2025-08-07 DIAGNOSIS — E11.9 TYPE 2 DIABETES MELLITUS WITHOUT COMPLICATION, WITHOUT LONG-TERM CURRENT USE OF INSULIN: Primary | ICD-10-CM

## 2025-08-07 DIAGNOSIS — E78.2 MIXED HYPERLIPIDEMIA: ICD-10-CM

## 2025-08-07 PROCEDURE — 99213 OFFICE O/P EST LOW 20 MIN: CPT

## 2025-08-07 PROCEDURE — 1036F TOBACCO NON-USER: CPT

## 2025-08-07 PROCEDURE — 3008F BODY MASS INDEX DOCD: CPT

## 2025-08-07 PROCEDURE — 3074F SYST BP LT 130 MM HG: CPT

## 2025-08-07 PROCEDURE — 3079F DIAST BP 80-89 MM HG: CPT

## 2025-08-07 RX ORDER — ROSUVASTATIN CALCIUM 10 MG/1
10 TABLET, COATED ORAL DAILY
Qty: 90 TABLET | Refills: 1 | Status: SHIPPED | OUTPATIENT
Start: 2025-08-07

## 2025-08-07 RX ORDER — METFORMIN HYDROCHLORIDE 500 MG/1
1000 TABLET, EXTENDED RELEASE ORAL 2 TIMES DAILY
Qty: 360 TABLET | Refills: 1 | Status: SHIPPED | OUTPATIENT
Start: 2025-08-07

## 2025-08-07 RX ORDER — LEVOTHYROXINE SODIUM 125 UG/1
TABLET ORAL
Qty: 90 TABLET | Refills: 1 | Status: SHIPPED | OUTPATIENT
Start: 2025-08-07

## 2025-08-07 NOTE — PROGRESS NOTES
Subjective   Patient ID: Caty Caputo is a 39 y.o. female who presents for Hypothyroidism, Hyperlipidemia, and Diabetes.  Today she is accompanied by alone.     Hyperlipidemia    Diabetes      1.  Type 2 diabetes  Patient is currently taking Metformin 1000mg BID; tolerating well.   Mentions that she feels well with the metformin  Most recent hemoglobin A1c was stable at 7.4%  Otherwise denies any sugar high/lows  Requesting refills     2. Hypothyroidism   Patient is currently taking levothyroxine and tolerating well.  Currently taking 125 mcg daily  Denies any signs/symptoms of weight gain, hot/cold intolerances, hair loss, etc.  Most recent TSH was at 0.92 and feels well     3. Hyperlipidemia   Currently on Rosuvastatin. Tolerating well.   Previous labs showed a total cholesterol 135, HDL 55.3, LDL 52, triglycerides 139  Currently taking all medication without any issues  Willing to do blood work  Requesting refills       Current Outpatient Medications on File Prior to Visit   Medication Sig Dispense Refill   • levothyroxine (Synthroid, Levoxyl) 125 mcg tablet TAKE 1 TABLET BY MOUTH ONCE DAILY 90 tablet 0   • metFORMIN  mg 24 hr tablet Take 2 tablets (1,000 mg) by mouth 2 times a day. Take in the morning and at bedtime 360 tablet 1   • rosuvastatin (Crestor) 10 mg tablet Take 1 tablet (10 mg) by mouth once daily. 90 tablet 1     No current facility-administered medications on file prior to visit.        Allergies   Allergen Reactions   • Amoxicillin Hives       Immunization History   Administered Date(s) Administered   • DTaP, Unspecified 1985, 1985, 02/27/1986, 06/05/1987, 08/26/1991   • Flu vaccine (IIV4), preservative free *Check age/dose* 10/08/2020, 12/13/2023   • Hep A / Hep B 09/15/2009, 10/14/2009   • HiB, unspecified 09/11/1987   • MMR vaccine, subcutaneous (MMR II) 02/27/1987   • Moderna SARS-CoV-2 Vaccination 03/17/2021, 04/14/2021, 11/22/2021   • Novel influenza-H1N1-09,  "preservative-free 01/26/2010   • Pfizer COVID-19 vaccine, 12 years and older, (30mcg/0.3mL) (Comirnaty) 12/13/2023   • Polio, Unspecified 1985, 1985, 02/27/1986, 06/05/1987, 08/26/1991   • Tdap vaccine, age 7 year and older (BOOSTRIX, ADACEL) 09/15/2009         Review of Systems  All pertinent positive symptoms are included in the history of present illness.  All other systems have been reviewed and are negative and noncontributory to this patient's current ailments.     Objective   /80   Pulse 68   Ht 1.594 m (5' 2.75\")   Wt 91.6 kg (202 lb)   BMI 36.07 kg/m²   BSA: 2.01 meters squared  No visits with results within 1 Month(s) from this visit.   Latest known visit with results is:   Office Visit on 01/27/2025   Component Date Value Ref Range Status   • TSH W/REFLEX TO FT4 01/28/2025 2.88  mIU/L Final    Comment:           Reference Range                         > or = 20 Years  0.40-4.50                              Pregnancy Ranges            First trimester    0.26-2.66            Second trimester   0.55-2.73            Third trimester    0.43-2.91     • CHOLESTEROL, TOTAL 01/28/2025 138  <200 mg/dL Final   • HDL CHOLESTEROL 01/28/2025 63  > OR = 50 mg/dL Final   • TRIGLYCERIDES 01/28/2025 116  <150 mg/dL Final   • LDL-CHOLESTEROL 01/28/2025 55  mg/dL (calc) Final    Comment: Reference range: <100     Desirable range <100 mg/dL for primary prevention;    <70 mg/dL for patients with CHD or diabetic patients   with > or = 2 CHD risk factors.     LDL-C is now calculated using the Luisito-Guilherme   calculation, which is a validated novel method providing   better accuracy than the Friedewald equation in the   estimation of LDL-C.   Luisito KOROMA et al. EASTON. 2013;310(19): 7830-7312   (http://education.[employer].Coolio/faq/VET552)     • CHOL/HDLC RATIO 01/28/2025 2.2  <5.0 (calc) Final   • NON HDL CHOLESTEROL 01/28/2025 75  <130 mg/dL (calc) Final    Comment: For patients with diabetes plus 1 " major ASCVD risk   factor, treating to a non-HDL-C goal of <100 mg/dL   (LDL-C of <70 mg/dL) is considered a therapeutic   option.     • GLUCOSE 01/28/2025 155 (H)  65 - 99 mg/dL Final    Comment:               Fasting reference interval     For someone without known diabetes, a glucose  value >125 mg/dL indicates that they may have  diabetes and this should be confirmed with a  follow-up test.        • UREA NITROGEN (BUN) 01/28/2025 11  7 - 25 mg/dL Final   • CREATININE 01/28/2025 0.59  0.50 - 0.97 mg/dL Final   • EGFR 01/28/2025 117  > OR = 60 mL/min/1.73m2 Final   • SODIUM 01/28/2025 139  135 - 146 mmol/L Final   • POTASSIUM 01/28/2025 4.2  3.5 - 5.3 mmol/L Final   • CHLORIDE 01/28/2025 100  98 - 110 mmol/L Final   • CARBON DIOXIDE 01/28/2025 26  20 - 32 mmol/L Final   • ELECTROLYTE BALANCE 01/28/2025 13  7 - 17 mmol/L (calc) Final   • CALCIUM 01/28/2025 9.2  8.6 - 10.2 mg/dL Final   • PROTEIN, TOTAL 01/28/2025 7.1  6.1 - 8.1 g/dL Final   • ALBUMIN 01/28/2025 4.6  3.6 - 5.1 g/dL Final   • BILIRUBIN, TOTAL 01/28/2025 0.6  0.2 - 1.2 mg/dL Final   • ALKALINE PHOSPHATASE 01/28/2025 64  31 - 125 U/L Final   • AST 01/28/2025 17  10 - 30 U/L Final   • ALT 01/28/2025 27  6 - 29 U/L Final   • HEMOGLOBIN A1c 01/28/2025 7.4 (H)  <5.7 % of total Hgb Final    Comment: For someone without known diabetes, a hemoglobin A1c  value of 6.5% or greater indicates that they may have   diabetes and this should be confirmed with a follow-up   test.     For someone with known diabetes, a value <7% indicates   that their diabetes is well controlled and a value   greater than or equal to 7% indicates suboptimal   control. A1c targets should be individualized based on   duration of diabetes, age, comorbid conditions, and   other considerations.     Currently, no consensus exists regarding use of  hemoglobin A1c for diagnosis of diabetes for children.         • eAG (mg/dL) 01/28/2025 166  mg/dL Final   • eAG (mmol/L) 01/28/2025 9.2  mmol/L  Final       Physical Exam  CONSTITUTIONAL - well nourished, well developed, looks like stated age, in no acute distress, not ill-appearing, and not tired appearing  SKIN - normal skin color and pigmentation, normal skin turgor without rash, lesions, or nodules visualized  HEAD - no trauma, normocephalic  EYES - normal external exam  ENT - TM's intact, no injection, no signs of infection, uvula midline, normal tongue movement and throat normal, no exudate, nasal passage without discharge and patent  NECK - supple without rigidity, no neck mass was observed, no thyromegaly or thyroid nodules  CHEST - clear to auscultation, no wheezing, no crackles and no rales, good effort  CARDIAC - regular rate and regular rhythm, no skipped beats, no murmur  ABDOMEN - no organomegaly, soft, nontender, nondistended, normal bowel sounds, no guarding/rebound/rigidity, negative McBurney sign and negative Danielle sign  EXTREMITIES - no edema, no deformities  NEUROLOGICAL - normal gait, normal balance, normal motor, no ataxia  PSYCHIATRIC - alert, pleasant and cordial, age-appropriate  IMMUNOLOGIC - no cervical lymphadenopathy     Assessment/Plan   1.  Type 2 diabetes  We will electronically send a refill for your Metformin to the pharmacy you requested.   No changes recommended at this time  Lab work ordered and we will notify with the results and make recommendations accordingly    2. Hypothyroidism   We will electronically send a refill for your levothyroxine to the pharmacy you requested.   Please continue to monitor signs and symptoms.  Update lab work and we will notify with results and make recommendations accordingly    3. Hyperlipidemia  We will electronically send a refill for your Rosuvastatin to the pharmacy you requested.   Please continue to monitor signs and symptoms   Please perform lab work when you are fasting, we will notify of the results and make recommendations accordingly      We briefly discussed once again looking  into insurances such as care source/Medicaid or any other medical coverage  Please do this at your earliest convenience  Contact the office if this does occur and we can discuss preventative medicine topics/needs further    Lastly, please follow-up in 6 months for continued care

## 2025-08-08 LAB
ALBUMIN SERPL-MCNC: 4.6 G/DL (ref 3.6–5.1)
ALP SERPL-CCNC: 60 U/L (ref 31–125)
ALT SERPL-CCNC: 34 U/L (ref 6–29)
ANION GAP SERPL CALCULATED.4IONS-SCNC: 8 MMOL/L (CALC) (ref 7–17)
AST SERPL-CCNC: 26 U/L (ref 10–30)
BILIRUB SERPL-MCNC: 0.7 MG/DL (ref 0.2–1.2)
BUN SERPL-MCNC: 11 MG/DL (ref 7–25)
CALCIUM SERPL-MCNC: 9.4 MG/DL (ref 8.6–10.2)
CHLORIDE SERPL-SCNC: 102 MMOL/L (ref 98–110)
CO2 SERPL-SCNC: 29 MMOL/L (ref 20–32)
CREAT SERPL-MCNC: 0.56 MG/DL (ref 0.5–0.97)
EGFRCR SERPLBLD CKD-EPI 2021: 119 ML/MIN/1.73M2
EST. AVERAGE GLUCOSE BLD GHB EST-MCNC: 174 MG/DL
EST. AVERAGE GLUCOSE BLD GHB EST-SCNC: 9.7 MMOL/L
GLUCOSE SERPL-MCNC: 147 MG/DL (ref 65–99)
HBA1C MFR BLD: 7.7 %
POTASSIUM SERPL-SCNC: 4.1 MMOL/L (ref 3.5–5.3)
PROT SERPL-MCNC: 6.7 G/DL (ref 6.1–8.1)
SODIUM SERPL-SCNC: 139 MMOL/L (ref 135–146)

## 2025-08-18 ENCOUNTER — TELEPHONE (OUTPATIENT)
Dept: PRIMARY CARE | Facility: CLINIC | Age: 40
End: 2025-08-18